# Patient Record
Sex: FEMALE
[De-identification: names, ages, dates, MRNs, and addresses within clinical notes are randomized per-mention and may not be internally consistent; named-entity substitution may affect disease eponyms.]

---

## 2019-10-23 ENCOUNTER — HOSPITAL ENCOUNTER (EMERGENCY)
Dept: HOSPITAL 41 - JD.ED | Age: 34
Discharge: HOME | End: 2019-10-23
Payer: COMMERCIAL

## 2019-10-23 VITALS — HEART RATE: 94 BPM | SYSTOLIC BLOOD PRESSURE: 146 MMHG | DIASTOLIC BLOOD PRESSURE: 90 MMHG

## 2019-10-23 DIAGNOSIS — R00.2: Primary | ICD-10-CM

## 2019-10-23 NOTE — EDM.PDOC
ED HPI GENERAL MEDICAL PROBLEM





- General


Chief Complaint: Cardiovascular Problem


Stated Complaint: HEART FLUTTER


Time Seen by Provider: 10/23/19 20:10





- History of Present Illness


INITIAL COMMENTS - FREE TEXT/NARRATIVE: 





33-year-old female presents emergency room with fluttering sensation in her 

chest..





Started this morning she had just E sensation across the top of her chest that 

went into her left arm was not associated with any breathing difficulty 

shortness of breath no chest pain no chest pressure. She's never had anything 

like this in the past she was advised to come in by her father who has a 

history of atrial fibrillation.











- Related Data


 Allergies











Allergy/AdvReac Type Severity Reaction Status Date / Time


 


No Known Allergies Allergy   Verified 10/23/19 20:09











Home Meds: 


 Home Meds





. [No Known Home Meds]  10/23/19 [History]











Past Medical History


HEENT History: Reports: Impaired Vision, Other (See Below)


Other HEENT History: weas glasses


Genitourinary History: Reports: STD, Other (See Below)


Other Genitourinary History: HPV


OB/GYN History: Reports: Pregnancy, Other (See Below)


Other OB/GYN History: dysplagia with pre-cancerous/abnormal cervical biopsy.





- Infectious Disease History


Infectious Disease History: Reports: Chicken Pox





- Past Surgical History


Oncologic Surgical History: Reports: Other (See Below)





ED ROS GENERAL





- Review of Systems


Review Of Systems: See Below


Constitutional: Reports: No Symptoms


HEENT: Reports: No Symptoms


Respiratory: Reports: No Symptoms


Cardiovascular: Reports: No Symptoms


Endocrine: Reports: No Symptoms


GI/Abdominal: Reports: No Symptoms





ED EXAM, GENERAL





- Physical Exam


Exam: See Below


General Appearance: Alert, WD/WN


Head: Atraumatic, Normocephalic


Neck: Normal Inspection, Supple, Non-Tender, Full Range of Motion.  No: 

Lymphadenopathy (L), Lymphadenopathy (R), Thyromegaly


Respiratory/Chest: No Respiratory Distress, Lungs Clear, Normal Breath Sounds


Cardiovascular: Regular Rate, Rhythm, No Edema, No Murmur


Extremities: Normal Inspection, Non-Tender, Pedal Edema (Trace but she says 

this is normal)





EKG INTERPRETATION


EKG Date: 10/23/19


Rhythm: NSR


Axis: Normal


P-Wave: Present


QRS: Normal


ST-T: Normal


QT: Normal


PA/PQ Interval: Normal


Comparison: NA - No Prior EKG


EKG Interpretation Comments: 





Normal EKG





Course





- Vital Signs


Last Recorded V/S: 


 Last Vital Signs











Temp  36.6 C   10/23/19 20:06


 


Pulse  94   10/23/19 20:06


 


Resp  16   10/23/19 20:06


 


BP  146/90 H  10/23/19 20:06


 


Pulse Ox  100   10/23/19 20:06














- Orders/Labs/Meds


Orders: 


 Active Orders 24 hr











 Category Date Time Status


 


 EKG 12 Lead [EKG Documentation Completion] [RC] STAT Care  10/23/19 20:16 

Active











Labs: 


 Laboratory Tests











  10/23/19 10/23/19 Range/Units





  20:29 20:29 


 


WBC  8.18   (3.98-10.04)  K/mm3


 


RBC  4.23   (3.98-5.22)  M/mm3


 


Hgb  13.3   (11.2-15.7)  gm/dl


 


Hct  39.0   (34.1-44.9)  %


 


MCV  92.2   (79.4-94.8)  fl


 


MCH  31.4   (25.6-32.2)  pg


 


MCHC  34.1   (32.2-35.5)  g/dl


 


RDW Std Deviation  39.8   (36.4-46.3)  fL


 


Plt Count  222   (182-369)  K/mm3


 


MPV  9.0 L   (9.4-12.3)  fl


 


Neutrophils % (Manual)  57   (40-60)  %


 


Band Neutrophils %  0   (0-10)  %


 


Lymphocytes % (Manual)  39   (20-40)  %


 


Atypical Lymphs %  0   %


 


Monocytes % (Manual)  3   (2-10)  %


 


Eosinophils % (Manual)  1   (0.7-5.8)  %


 


Basophils % (Manual)  0 L   (0.1-1.2)  


 


Platelet Estimate  Adequate   


 


Plt Morphology Comment  Normal   


 


RBC Morph Comment  Normal   


 


Sodium   139  (136-145)  mEq/L


 


Potassium   3.7  (3.5-5.1)  mEq/L


 


Chloride   104  ()  mEq/L


 


Carbon Dioxide   27  (21-32)  mEq/L


 


Anion Gap   11.7  (5-15)  


 


BUN   16  (7-18)  mg/dL


 


Creatinine   0.8  (0.55-1.02)  mg/dL


 


Est Cr Clr Drug Dosing   96.36  mL/min


 


Estimated GFR (MDRD)   > 60  (>60)  mL/min


 


BUN/Creatinine Ratio   20.0 H  (14-18)  


 


Glucose   80  ()  mg/dL


 


Calcium   8.8  (8.5-10.1)  mg/dL


 


Magnesium   1.8  (1.8-2.4)  mg/dl














- Re-Assessments/Exams


Free Text/Narrative Re-Assessment/Exam: 





10/23/19 21:25


No ectopy was she was here in the emergency department magnesium was borderline 

low we'll start her on mag ox recommend starting some daily calcium and see how 

she does she like to hold off on a monitor at this point which I thinks 

reasonable.





Departure





- Departure


Time of Disposition: 21:26


Disposition: Home, Self-Care 01


Clinical Impression: 


 Palpitations with regular cardiac rhythm





Referrals: 


Bren López PA-C [Primary Care Provider] - 


Forms:  ED Department Discharge


Additional Instructions: 


Return to the emergency room with any questions problems or worsening symptoms.





Follow-up with your regular provider in one week. 





 some over-the-counter magnesium oxide 400 mg take 1 daily. 





Start calcium supplements as this may help as well this works better if taken 

with vitamin D.





- My Orders


Last 24 Hours: 


My Active Orders





10/23/19 20:16


EKG 12 Lead [EKG Documentation Completion] [RC] STAT 














- Assessment/Plan


Last 24 Hours: 


My Active Orders





10/23/19 20:16


EKG 12 Lead [EKG Documentation Completion] [RC] STAT